# Patient Record
Sex: MALE | Race: WHITE | NOT HISPANIC OR LATINO | Employment: FULL TIME | URBAN - METROPOLITAN AREA
[De-identification: names, ages, dates, MRNs, and addresses within clinical notes are randomized per-mention and may not be internally consistent; named-entity substitution may affect disease eponyms.]

---

## 2024-08-08 ENCOUNTER — OFFICE VISIT (OUTPATIENT)
Dept: INTERNAL MEDICINE | Facility: CLINIC | Age: 24
End: 2024-08-08
Payer: COMMERCIAL

## 2024-08-08 VITALS
OXYGEN SATURATION: 95 % | HEART RATE: 64 BPM | HEIGHT: 71 IN | SYSTOLIC BLOOD PRESSURE: 100 MMHG | DIASTOLIC BLOOD PRESSURE: 70 MMHG | BODY MASS INDEX: 24.72 KG/M2 | TEMPERATURE: 97.8 F | WEIGHT: 176.6 LBS

## 2024-08-08 DIAGNOSIS — Z13.21 ENCOUNTER FOR VITAMIN DEFICIENCY SCREENING: ICD-10-CM

## 2024-08-08 DIAGNOSIS — Z13.0 SCREENING FOR DEFICIENCY ANEMIA: ICD-10-CM

## 2024-08-08 DIAGNOSIS — Z13.220 LIPID SCREENING: ICD-10-CM

## 2024-08-08 DIAGNOSIS — Z13.29 SCREENING FOR ENDOCRINE DISORDER: ICD-10-CM

## 2024-08-08 DIAGNOSIS — B35.6 FUNGAL INFECTION OF THE GROIN: ICD-10-CM

## 2024-08-08 DIAGNOSIS — Z11.59 ENCOUNTER FOR HEPATITIS C SCREENING TEST FOR LOW RISK PATIENT: ICD-10-CM

## 2024-08-08 DIAGNOSIS — Z00.00 ENCOUNTER FOR MEDICAL EXAMINATION TO ESTABLISH CARE: Primary | ICD-10-CM

## 2024-08-08 PROCEDURE — 99203 OFFICE O/P NEW LOW 30 MIN: CPT

## 2024-08-08 RX ORDER — CLOTRIMAZOLE 1 G/ML
SOLUTION TOPICAL 2 TIMES DAILY
Qty: 60 ML | Refills: 1 | Status: SHIPPED | OUTPATIENT
Start: 2024-08-08

## 2024-08-08 NOTE — PROGRESS NOTES
"    Male Physical Note      Name: Calvin Ball    : 2000     MRN: [id number]   Care Team: Patient Care Team:  Adina Burgos APRN as PCP - General (Family Medicine)    Chief Complaint  Rash    Subjective     History of Present Illness:    Calvin is a pleasant 24-year-old male who comes to the office today for a skin rash.  He is here to establish care.    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  He is considering CRNA programs for the future.    He denies any significant medical history or surgical history, other than wisdom teeth removal.  He is not currently on any medications.    He denies the use of tobacco or drugs.  He reports occasional or \"social\" alcohol use.    Calvin states that he eats a healthy diet and exercises at a gym regularly.    He is currently experiencing a dry, scaly, itchy rash to his bilateral groin.  He states that the area is slightly red and irritated.  He states that it has been there for a couple of weeks.    The patient is being seen for a health maintenance evaluation.    History reviewed. No pertinent past medical history.  History reviewed. No pertinent surgical history.  History reviewed. No pertinent family history.  Social History     Tobacco Use   Smoking Status Never   Smokeless Tobacco Never     No Known Allergies    Current Outpatient Medications:     clotrimazole (LOTRIMIN) 1 % external solution, Apply  topically to the appropriate area as directed 2 (Two) Times a Day., Disp: 60 mL, Rfl: 1    General History  Calvin  {DOES/DOES NOT:39427} have regular dental visits.  He {DOES/DOES NOT:77483} complain of vision problems. Last eye exam was ***  Immunizations {ARE/ARE NOT:64262} up to date. The patient needs the following immunizations: ***    Lifestyle  Calvin  consumes a {Desc; diets:93164}.  He exercises {desc; exercise:38949}.    Reproductive Health  Calvin  {IS/ IS NOT:52407} sexually active. His " "contraceptive plan is {PLAN CONTRACEPTION:506194}.   He {DOES/DOES NOT:14450} have erectile dysfunction.     Screening  Last PSA was ***.  Last prostate exam was  ***. Family history of prostate cancer: ***  Last testicular exam was ***.   Last colonoscopy was   Last Completed Colonoscopy       This patient has no relevant Health Maintenance data.        . Family history of colon cancer: ***  Other pertinent family history and/or screenings: ***    PHQ-9 Total Score:        Objective     Vital Signs  /70 (BP Location: Left arm, Patient Position: Sitting, Cuff Size: Adult)   Pulse 64   Temp 97.8 °F (36.6 °C) (Infrared)   Ht 180.9 cm (71.22\")   Wt 80.1 kg (176 lb 9.6 oz)   SpO2 95%   BMI 24.48 kg/m²   Estimated body mass index is 24.48 kg/m² as calculated from the following:    Height as of this encounter: 180.9 cm (71.22\").    Weight as of this encounter: 80.1 kg (176 lb 9.6 oz).    BMI is within normal parameters. No other follow-up for BMI required.      Physical Exam  Vitals and nursing note reviewed.   HENT:      Head: Normocephalic.   Cardiovascular:      Rate and Rhythm: Normal rate.   Pulmonary:      Effort: Pulmonary effort is normal.      Breath sounds: Normal breath sounds.   Skin:     General: Skin is warm and dry.   Neurological:      General: No focal deficit present.      Mental Status: He is alert and oriented to person, place, and time.   Psychiatric:         Mood and Affect: Mood normal.         Behavior: Behavior normal.         Thought Content: Thought content normal.         Judgment: Judgment normal.            ***DAXRESULTS  Assessment and Plan     Diagnoses and all orders for this visit:    1. Encounter for medical examination to establish care (Primary)  -     CBC & Differential; Future  -     Comprehensive Metabolic Panel; Future  -     Lipid Panel; Future  -     TSH Rfx On Abnormal To Free T4; Future  -     Hepatitis C Antibody; Future  -     Vitamin D,25-Hydroxy; Future  -     " Vitamin B12; Future    2. Fungal infection of the groin  -     clotrimazole (LOTRIMIN) 1 % external solution; Apply  topically to the appropriate area as directed 2 (Two) Times a Day.  Dispense: 60 mL; Refill: 1    3. Screening for deficiency anemia  -     CBC & Differential; Future    4. Lipid screening  -     Lipid Panel; Future    5. Screening for endocrine disorder  -     TSH Rfx On Abnormal To Free T4; Future    6. Encounter for hepatitis C screening test for low risk patient  -     Hepatitis C Antibody; Future    7. Encounter for vitamin deficiency screening  -     Vitamin D,25-Hydroxy; Future  -     Vitamin B12; Future      ***DAXPLAN  There are no Patient Instructions on file for this visit.    Counseling/Anticipatory Guidance:   Plan of care reviewed with patient at the conclusion of today's visit. Education was provided in regards to diagnosis, diet and exercise, prostate cancer screening discussed including benefit of early detection, potential need for follow-up, and harms associated with additional management. Patient agrees to screening.    Nutrition, physical activity, healthy weight,ways to reduce stress, adequate sleep, injury prevention, misuse of tobacco, alcohol and drugs, sexual behavior and STD's, dental health, mental health, and immunizations.    Plan of care reviewed with patient at the conclusion of today's visit. Education was provided regarding diagnosis, management and any prescribed or recommended OTC medications.  Patient verbalizes understanding of and agreement with management plan.      Return in about 6 months (around 2/8/2025) for Annual Physical next visit.    Adina Burgos, APRN

## 2024-08-08 NOTE — PROGRESS NOTES
"    Office Note     Name: Calvin Ball    : 2000     MRN: [id number]   Care Team: Patient Care Team:  Adina Burgos APRN as PCP - General (Family Medicine)    Chief Complaint  Rash    Subjective     History of Present Illness:    Calvin is a pleasant 24-year-old male who comes to the office today for a skin rash.  He is here to establish care.    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  He is considering CRNA programs for the future.    He denies any significant medical history or surgical history, other than wisdom teeth removal.  He is not currently on any medications.    He denies the use of tobacco or drugs.  He reports occasional or \"social\" alcohol use.    Calvin states that he eats a healthy diet and exercises at a gym regularly.    He is currently experiencing a dry, scaly, itchy rash to his bilateral groin.  He states that the area is slightly red and irritated.  He states that it has been there for a couple of weeks.      History reviewed. No pertinent past medical history.    History reviewed. No pertinent surgical history.    Social History     Socioeconomic History    Marital status: Single   Tobacco Use    Smoking status: Never    Smokeless tobacco: Never   Vaping Use    Vaping status: Never Used   Substance and Sexual Activity    Alcohol use: Yes     Alcohol/week: 5.0 standard drinks of alcohol     Types: 5 Cans of beer per week     Comment: Social events    Drug use: Never    Sexual activity: Yes     Partners: Female     Birth control/protection: I.U.D.         Current Outpatient Medications:     clotrimazole (LOTRIMIN) 1 % external solution, Apply  topically to the appropriate area as directed 2 (Two) Times a Day., Disp: 60 mL, Rfl: 1    Procedures    PHQ-9 Total Score:      Objective     Vital Signs  /70 (BP Location: Left arm, Patient Position: Sitting, Cuff Size: Adult)   Pulse 64   Temp 97.8 °F (36.6 °C) (Infrared)  " " Ht 180.9 cm (71.22\")   Wt 80.1 kg (176 lb 9.6 oz)   SpO2 95%   BMI 24.48 kg/m²   Estimated body mass index is 24.48 kg/m² as calculated from the following:    Height as of this encounter: 180.9 cm (71.22\").    Weight as of this encounter: 80.1 kg (176 lb 9.6 oz).    BMI is within normal parameters. No other follow-up for BMI required.      Physical Exam  Vitals and nursing note reviewed.   HENT:      Head: Normocephalic.   Cardiovascular:      Rate and Rhythm: Normal rate.   Pulmonary:      Effort: Pulmonary effort is normal.      Breath sounds: Normal breath sounds.   Skin:     General: Skin is warm and dry.   Neurological:      General: No focal deficit present.      Mental Status: He is alert and oriented to person, place, and time.   Psychiatric:         Mood and Affect: Mood normal.         Behavior: Behavior normal.         Thought Content: Thought content normal.         Judgment: Judgment normal.          Assessment and Plan     Assessment/Plan:  Diagnoses and all orders for this visit:    1. Encounter for medical examination to establish care (Primary)  -     CBC & Differential; Future  -     Comprehensive Metabolic Panel; Future  -     Lipid Panel; Future  -     TSH Rfx On Abnormal To Free T4; Future  -     Hepatitis C Antibody; Future  -     Vitamin D,25-Hydroxy; Future  -     Vitamin B12; Future  -Will obtain full panel of yearly labs today.  -Return in 6 months for annual physical.    2. Fungal infection of the groin  -     clotrimazole (LOTRIMIN) 1 % external solution; Apply  topically to the appropriate area as directed 2 (Two) Times a Day.  Dispense: 60 mL; Refill: 1  -Discussed and encouraged to use nonfragrant soap and pat dry.  Apply cream twice daily.  If symptoms remain or worsen, send message on [employer] or return to office.    3. Screening for deficiency anemia  -     CBC & Differential; Future    4. Lipid screening  -     Lipid Panel; Future    5. Screening for endocrine disorder  -     " TSH Rfx On Abnormal To Free T4; Future    6. Encounter for hepatitis C screening test for low risk patient  -     Hepatitis C Antibody; Future    7. Encounter for vitamin deficiency screening  -     Vitamin D,25-Hydroxy; Future  -     Vitamin B12; Future       There are no Patient Instructions on file for this visit.  Plan of care reviewed with patient at the conclusion of today's visit. Education was provided regarding diagnosis, management and any prescribed or recommended OTC medications.  Patient verbalizes understanding of and agreement with management plan.    Follow Up  Return in about 6 months (around 2/8/2025) for Annual Physical next visit.    Adina Burgos, APRN

## 2024-09-24 ENCOUNTER — PATIENT MESSAGE (OUTPATIENT)
Dept: INTERNAL MEDICINE | Facility: CLINIC | Age: 24
End: 2024-09-24
Payer: COMMERCIAL

## 2024-09-24 DIAGNOSIS — B35.6 FUNGAL INFECTION OF THE GROIN: ICD-10-CM

## 2024-09-24 RX ORDER — CLOTRIMAZOLE 1 G/ML
SOLUTION TOPICAL 2 TIMES DAILY
Qty: 60 ML | Refills: 1 | Status: SHIPPED | OUTPATIENT
Start: 2024-09-24

## 2024-09-26 ENCOUNTER — PRIOR AUTHORIZATION (OUTPATIENT)
Dept: INTERNAL MEDICINE | Facility: CLINIC | Age: 24
End: 2024-09-26
Payer: COMMERCIAL